# Patient Record
Sex: FEMALE | Race: BLACK OR AFRICAN AMERICAN | Employment: PART TIME | URBAN - METROPOLITAN AREA
[De-identification: names, ages, dates, MRNs, and addresses within clinical notes are randomized per-mention and may not be internally consistent; named-entity substitution may affect disease eponyms.]

---

## 2018-10-08 ENCOUNTER — HOSPITAL ENCOUNTER (EMERGENCY)
Age: 28
Discharge: HOME | End: 2018-10-08

## 2018-10-08 ENCOUNTER — APPOINTMENT (OUTPATIENT)
Dept: CT IMAGING | Age: 28
End: 2018-10-08

## 2018-10-08 VITALS
OXYGEN SATURATION: 100 % | HEART RATE: 48 BPM | RESPIRATION RATE: 16 BRPM | WEIGHT: 128.13 LBS | DIASTOLIC BLOOD PRESSURE: 70 MMHG | BODY MASS INDEX: 21.32 KG/M2 | TEMPERATURE: 98.2 F | SYSTOLIC BLOOD PRESSURE: 114 MMHG

## 2018-10-08 DIAGNOSIS — R19.7 NAUSEA VOMITING AND DIARRHEA: Primary | ICD-10-CM

## 2018-10-08 DIAGNOSIS — R11.2 NAUSEA VOMITING AND DIARRHEA: Primary | ICD-10-CM

## 2018-10-08 LAB
A/G RATIO: 1.2 (ref 1.1–2.2)
ALBUMIN SERPL-MCNC: 4.4 G/DL (ref 3.4–5)
ALP BLD-CCNC: 74 U/L (ref 40–129)
ALT SERPL-CCNC: 10 U/L (ref 10–40)
ANION GAP SERPL CALCULATED.3IONS-SCNC: 9 MMOL/L (ref 3–16)
AST SERPL-CCNC: 17 U/L (ref 15–37)
BACTERIA: ABNORMAL /HPF
BASOPHILS ABSOLUTE: 0 K/UL (ref 0–0.2)
BASOPHILS RELATIVE PERCENT: 0.2 %
BILIRUB SERPL-MCNC: 0.3 MG/DL (ref 0–1)
BILIRUBIN URINE: NEGATIVE
BLOOD, URINE: NEGATIVE
BUN BLDV-MCNC: 13 MG/DL (ref 7–20)
CALCIUM SERPL-MCNC: 9.9 MG/DL (ref 8.3–10.6)
CHLORIDE BLD-SCNC: 103 MMOL/L (ref 99–110)
CLARITY: ABNORMAL
CO2: 26 MMOL/L (ref 21–32)
COLOR: YELLOW
CREAT SERPL-MCNC: 0.6 MG/DL (ref 0.6–1.1)
EOSINOPHILS ABSOLUTE: 0 K/UL (ref 0–0.6)
EOSINOPHILS RELATIVE PERCENT: 0.4 %
EPITHELIAL CELLS, UA: 5 /HPF (ref 0–5)
GFR AFRICAN AMERICAN: >60
GFR NON-AFRICAN AMERICAN: >60
GLOBULIN: 3.6 G/DL
GLUCOSE BLD-MCNC: 112 MG/DL (ref 70–99)
GLUCOSE URINE: NEGATIVE MG/DL
GONADOTROPIN, CHORIONIC (HCG) QUANT: <5 MIU/ML
HCT VFR BLD CALC: 45.2 % (ref 36–48)
HEMOGLOBIN: 14.7 G/DL (ref 12–16)
HYALINE CASTS: 9 /LPF (ref 0–8)
KETONES, URINE: NEGATIVE MG/DL
LEUKOCYTE ESTERASE, URINE: ABNORMAL
LIPASE: 49 U/L (ref 13–60)
LYMPHOCYTES ABSOLUTE: 1.2 K/UL (ref 1–5.1)
LYMPHOCYTES RELATIVE PERCENT: 15.9 %
MCH RBC QN AUTO: 27.3 PG (ref 26–34)
MCHC RBC AUTO-ENTMCNC: 32.4 G/DL (ref 31–36)
MCV RBC AUTO: 84.3 FL (ref 80–100)
MICROSCOPIC EXAMINATION: YES
MONOCYTES ABSOLUTE: 0.7 K/UL (ref 0–1.3)
MONOCYTES RELATIVE PERCENT: 8.6 %
NEUTROPHILS ABSOLUTE: 5.7 K/UL (ref 1.7–7.7)
NEUTROPHILS RELATIVE PERCENT: 74.9 %
NITRITE, URINE: NEGATIVE
PDW BLD-RTO: 13.6 % (ref 12.4–15.4)
PH UA: 6
PLATELET # BLD: 228 K/UL (ref 135–450)
PMV BLD AUTO: 8.5 FL (ref 5–10.5)
POTASSIUM SERPL-SCNC: 4.2 MMOL/L (ref 3.5–5.1)
PROTEIN UA: NEGATIVE MG/DL
RBC # BLD: 5.37 M/UL (ref 4–5.2)
RBC UA: 3 /HPF (ref 0–4)
SODIUM BLD-SCNC: 138 MMOL/L (ref 136–145)
SPECIFIC GRAVITY UA: 1.02
TOTAL PROTEIN: 8 G/DL (ref 6.4–8.2)
URINE REFLEX TO CULTURE: YES
URINE TYPE: ABNORMAL
UROBILINOGEN, URINE: 0.2 E.U./DL
WBC # BLD: 7.7 K/UL (ref 4–11)
WBC UA: 4 /HPF (ref 0–5)

## 2018-10-08 PROCEDURE — 83690 ASSAY OF LIPASE: CPT

## 2018-10-08 PROCEDURE — 6360000004 HC RX CONTRAST MEDICATION: Performed by: NURSE PRACTITIONER

## 2018-10-08 PROCEDURE — 80053 COMPREHEN METABOLIC PANEL: CPT

## 2018-10-08 PROCEDURE — 87086 URINE CULTURE/COLONY COUNT: CPT

## 2018-10-08 PROCEDURE — 85025 COMPLETE CBC W/AUTO DIFF WBC: CPT

## 2018-10-08 PROCEDURE — 84702 CHORIONIC GONADOTROPIN TEST: CPT

## 2018-10-08 PROCEDURE — 36415 COLL VENOUS BLD VENIPUNCTURE: CPT

## 2018-10-08 PROCEDURE — 81001 URINALYSIS AUTO W/SCOPE: CPT

## 2018-10-08 PROCEDURE — 99284 EMERGENCY DEPT VISIT MOD MDM: CPT

## 2018-10-08 PROCEDURE — 96374 THER/PROPH/DIAG INJ IV PUSH: CPT

## 2018-10-08 PROCEDURE — 6360000002 HC RX W HCPCS: Performed by: PHYSICIAN ASSISTANT

## 2018-10-08 PROCEDURE — 74177 CT ABD & PELVIS W/CONTRAST: CPT

## 2018-10-08 PROCEDURE — 2580000003 HC RX 258: Performed by: PHYSICIAN ASSISTANT

## 2018-10-08 PROCEDURE — 96361 HYDRATE IV INFUSION ADD-ON: CPT

## 2018-10-08 PROCEDURE — 96375 TX/PRO/DX INJ NEW DRUG ADDON: CPT

## 2018-10-08 RX ORDER — 0.9 % SODIUM CHLORIDE 0.9 %
1000 INTRAVENOUS SOLUTION INTRAVENOUS ONCE
Status: COMPLETED | OUTPATIENT
Start: 2018-10-08 | End: 2018-10-08

## 2018-10-08 RX ORDER — MORPHINE SULFATE 4 MG/ML
4 INJECTION, SOLUTION INTRAMUSCULAR; INTRAVENOUS ONCE
Status: COMPLETED | OUTPATIENT
Start: 2018-10-08 | End: 2018-10-08

## 2018-10-08 RX ORDER — ONDANSETRON 4 MG/1
4-8 TABLET, ORALLY DISINTEGRATING ORAL EVERY 12 HOURS PRN
Qty: 12 TABLET | Refills: 0 | Status: ON HOLD | OUTPATIENT
Start: 2018-10-08 | End: 2019-05-10 | Stop reason: HOSPADM

## 2018-10-08 RX ORDER — DICYCLOMINE HYDROCHLORIDE 10 MG/1
10 CAPSULE ORAL EVERY 6 HOURS PRN
Qty: 20 CAPSULE | Refills: 0 | Status: ON HOLD | OUTPATIENT
Start: 2018-10-08 | End: 2019-05-10 | Stop reason: HOSPADM

## 2018-10-08 RX ORDER — ONDANSETRON 2 MG/ML
4 INJECTION INTRAMUSCULAR; INTRAVENOUS ONCE
Status: COMPLETED | OUTPATIENT
Start: 2018-10-08 | End: 2018-10-08

## 2018-10-08 RX ADMIN — SODIUM CHLORIDE 1000 ML: 9 INJECTION, SOLUTION INTRAVENOUS at 12:56

## 2018-10-08 RX ADMIN — ONDANSETRON 4 MG: 2 INJECTION INTRAMUSCULAR; INTRAVENOUS at 12:56

## 2018-10-08 RX ADMIN — MORPHINE SULFATE 4 MG: 4 INJECTION INTRAVENOUS at 12:56

## 2018-10-08 RX ADMIN — IOPAMIDOL 75 ML: 755 INJECTION, SOLUTION INTRAVENOUS at 13:58

## 2018-10-08 ASSESSMENT — ENCOUNTER SYMPTOMS
RHINORRHEA: 0
DIARRHEA: 0
CONSTIPATION: 0
ABDOMINAL PAIN: 1
NAUSEA: 1
SHORTNESS OF BREATH: 0
BLOOD IN STOOL: 0
VOMITING: 0
SORE THROAT: 0

## 2018-10-08 ASSESSMENT — PAIN DESCRIPTION - LOCATION: LOCATION: ABDOMEN

## 2018-10-08 ASSESSMENT — PAIN SCALES - GENERAL
PAINLEVEL_OUTOF10: 7
PAINLEVEL_OUTOF10: 9

## 2018-10-08 ASSESSMENT — PAIN DESCRIPTION - PROGRESSION: CLINICAL_PROGRESSION: GRADUALLY IMPROVING

## 2018-10-08 ASSESSMENT — PAIN DESCRIPTION - PAIN TYPE: TYPE: ACUTE PAIN

## 2018-10-08 NOTE — ED NOTES
Pt d/c instructions given. Pt verbalizes understanding. Pt getting dressed.       Merna Bucoi, RN  10/08/18 7805

## 2018-10-08 NOTE — ED PROVIDER NOTES
Cardiovascular: Normal rate, regular rhythm, normal heart sounds and intact distal pulses. Exam reveals no gallop and no friction rub. No murmur heard. Pulmonary/Chest: Effort normal and breath sounds normal. No stridor. No respiratory distress. She has no wheezes. She has no rales. She exhibits no tenderness. Abdominal: Soft. Bowel sounds are normal. She exhibits no distension and no mass. There is no tenderness. There is no rebound and no guarding. Musculoskeletal: Normal range of motion. She exhibits no edema or tenderness. Neurological: She is alert and oriented to person, place, and time. Coordination normal.   Skin: Skin is warm and dry. She is not diaphoretic. No pallor. Psychiatric: She has a normal mood and affect. Her behavior is normal.   Nursing note and vitals reviewed.       DIAGNOSTIC RESULTS   LABS:    Labs Reviewed   CBC WITH AUTO DIFFERENTIAL - Abnormal; Notable for the following:        Result Value    RBC 5.37 (*)     All other components within normal limits    Narrative:     Performed at:  [facility] [address], [place], [address] [address]   Phone [phone]   COMPREHENSIVE METABOLIC PANEL - Abnormal; Notable for the following:     Glucose 112 (*)     All other components within normal limits    Narrative:     Performed at:  [facility] [address], [place], [facility] [address]   Phone [phone]   URINE RT REFLEX TO CULTURE - Abnormal; Notable for the following:     Clarity, UA CLOUDY (*)     Leukocyte Esterase, Urine TRACE (*)     All other components within normal limits    Narrative:     Performed at:  [facility] [address], [place], [facility] [address]   Phone [phone]   MICROSCOPIC URINALYSIS - Abnormal; Notable for the following:     Bacteria, UA RARE (*)     Hyaline Casts, UA 9 (*)     All other components within normal limits    Narrative:     Performed at:  [facility]

## 2018-10-09 LAB — URINE CULTURE, ROUTINE: NORMAL

## 2019-02-10 ENCOUNTER — HOSPITAL ENCOUNTER (EMERGENCY)
Age: 29
Discharge: HOME | End: 2019-02-10
Payer: MEDICAID

## 2019-02-10 VITALS
SYSTOLIC BLOOD PRESSURE: 120 MMHG | BODY MASS INDEX: 20.83 KG/M2 | DIASTOLIC BLOOD PRESSURE: 65 MMHG | RESPIRATION RATE: 18 BRPM | HEART RATE: 70 BPM | OXYGEN SATURATION: 100 % | WEIGHT: 125 LBS | TEMPERATURE: 98 F | HEIGHT: 65 IN

## 2019-02-10 DIAGNOSIS — K08.89 PAIN, DENTAL: ICD-10-CM

## 2019-02-10 DIAGNOSIS — R22.0 MOUTH SWELLING: Primary | ICD-10-CM

## 2019-02-10 PROCEDURE — 99282 EMERGENCY DEPT VISIT SF MDM: CPT

## 2019-02-10 PROCEDURE — 6370000000 HC RX 637 (ALT 250 FOR IP): Performed by: NURSE PRACTITIONER

## 2019-02-10 RX ORDER — HYDROCODONE BITARTRATE AND ACETAMINOPHEN 5; 325 MG/1; MG/1
1 TABLET ORAL ONCE
Status: COMPLETED | OUTPATIENT
Start: 2019-02-10 | End: 2019-02-10

## 2019-02-10 RX ORDER — IBUPROFEN 800 MG/1
800 TABLET ORAL EVERY 6 HOURS PRN
COMMUNITY
End: 2019-02-10

## 2019-02-10 RX ORDER — HYDROCODONE BITARTRATE AND ACETAMINOPHEN 5; 325 MG/1; MG/1
1 TABLET ORAL EVERY 6 HOURS PRN
Qty: 10 TABLET | Refills: 0 | Status: SHIPPED | OUTPATIENT
Start: 2019-02-10 | End: 2019-02-13

## 2019-02-10 RX ORDER — IBUPROFEN 800 MG/1
800 TABLET ORAL EVERY 8 HOURS PRN
Qty: 20 TABLET | Refills: 0 | Status: SHIPPED | OUTPATIENT
Start: 2019-02-10

## 2019-02-10 RX ORDER — HYDROCODONE BITARTRATE AND ACETAMINOPHEN 7.5; 325 MG/1; MG/1
1 TABLET ORAL EVERY 6 HOURS PRN
COMMUNITY
End: 2019-02-10

## 2019-02-10 RX ORDER — IBUPROFEN 800 MG/1
800 TABLET ORAL ONCE
Status: COMPLETED | OUTPATIENT
Start: 2019-02-10 | End: 2019-02-10

## 2019-02-10 RX ADMIN — IBUPROFEN 800 MG: 800 TABLET, FILM COATED ORAL at 22:23

## 2019-02-10 RX ADMIN — HYDROCODONE BITARTRATE AND ACETAMINOPHEN 1 TABLET: 5; 325 TABLET ORAL at 22:24

## 2019-02-10 ASSESSMENT — ENCOUNTER SYMPTOMS
ABDOMINAL PAIN: 0
CHEST TIGHTNESS: 0
NAUSEA: 0
DIARRHEA: 0
SHORTNESS OF BREATH: 0
VOMITING: 0

## 2019-02-10 ASSESSMENT — PAIN SCALES - GENERAL: PAINLEVEL_OUTOF10: 10

## 2019-02-10 ASSESSMENT — PAIN DESCRIPTION - PAIN TYPE: TYPE: ACUTE PAIN

## 2019-02-10 ASSESSMENT — PAIN DESCRIPTION - LOCATION: LOCATION: MOUTH

## 2019-05-07 ENCOUNTER — HOSPITAL ENCOUNTER (INPATIENT)
Age: 29
LOS: 3 days | Discharge: HOME | DRG: 885 | End: 2019-05-10
Attending: PSYCHIATRY & NEUROLOGY | Admitting: PSYCHIATRY & NEUROLOGY
Payer: MEDICAID

## 2019-05-07 ENCOUNTER — HOSPITAL ENCOUNTER (EMERGENCY)
Age: 29
Discharge: PSYCHIATRIC OR SUBSTANCE USE FACILITY | End: 2019-05-07
Attending: EMERGENCY MEDICINE
Payer: MEDICAID

## 2019-05-07 VITALS
BODY MASS INDEX: 20.47 KG/M2 | OXYGEN SATURATION: 98 % | TEMPERATURE: 98 F | HEART RATE: 78 BPM | DIASTOLIC BLOOD PRESSURE: 89 MMHG | SYSTOLIC BLOOD PRESSURE: 135 MMHG | WEIGHT: 123 LBS | RESPIRATION RATE: 16 BRPM

## 2019-05-07 DIAGNOSIS — F19.10 SUBSTANCE ABUSE (HCC): ICD-10-CM

## 2019-05-07 DIAGNOSIS — R45.851 DEPRESSION WITH SUICIDAL IDEATION: Primary | ICD-10-CM

## 2019-05-07 DIAGNOSIS — R45.851 PLANNING TO COMMIT SUICIDE: ICD-10-CM

## 2019-05-07 DIAGNOSIS — F32.A DEPRESSION WITH SUICIDAL IDEATION: Primary | ICD-10-CM

## 2019-05-07 LAB
A/G RATIO: 1.5 (ref 1.1–2.2)
ACETAMINOPHEN LEVEL: <5 UG/ML (ref 10–30)
ALBUMIN SERPL-MCNC: 4.1 G/DL (ref 3.4–5)
ALP BLD-CCNC: 59 U/L (ref 40–129)
ALT SERPL-CCNC: 10 U/L (ref 10–40)
AMPHETAMINE SCREEN, URINE: POSITIVE
ANION GAP SERPL CALCULATED.3IONS-SCNC: 13 MMOL/L (ref 3–16)
AST SERPL-CCNC: 18 U/L (ref 15–37)
BACTERIA WET PREP: NORMAL
BARBITURATE SCREEN URINE: ABNORMAL
BASOPHILS ABSOLUTE: 0.1 K/UL (ref 0–0.2)
BASOPHILS RELATIVE PERCENT: 0.9 %
BENZODIAZEPINE SCREEN, URINE: POSITIVE
BILIRUB SERPL-MCNC: 0.5 MG/DL (ref 0–1)
BILIRUBIN URINE: NEGATIVE
BLOOD, URINE: NEGATIVE
BUN BLDV-MCNC: 14 MG/DL (ref 7–20)
CALCIUM SERPL-MCNC: 9.4 MG/DL (ref 8.3–10.6)
CANNABINOID SCREEN URINE: ABNORMAL
CHLORIDE BLD-SCNC: 99 MMOL/L (ref 99–110)
CLARITY: CLEAR
CLUE CELLS: NORMAL
CO2: 25 MMOL/L (ref 21–32)
COCAINE METABOLITE SCREEN URINE: POSITIVE
COLOR: YELLOW
CREAT SERPL-MCNC: <0.5 MG/DL (ref 0.6–1.1)
EKG ATRIAL RATE: 81 BPM
EKG DIAGNOSIS: NORMAL
EKG P AXIS: 66 DEGREES
EKG P-R INTERVAL: 168 MS
EKG Q-T INTERVAL: 376 MS
EKG QRS DURATION: 88 MS
EKG QTC CALCULATION (BAZETT): 436 MS
EKG R AXIS: 74 DEGREES
EKG T AXIS: 61 DEGREES
EKG VENTRICULAR RATE: 81 BPM
EOSINOPHILS ABSOLUTE: 0.2 K/UL (ref 0–0.6)
EOSINOPHILS RELATIVE PERCENT: 3.7 %
EPITHELIAL CELLS WET PREP: NORMAL
ETHANOL: NORMAL MG/DL (ref 0–0.08)
GFR AFRICAN AMERICAN: >60
GFR NON-AFRICAN AMERICAN: >60
GLOBULIN: 2.8 G/DL
GLUCOSE BLD-MCNC: 102 MG/DL (ref 70–99)
GLUCOSE URINE: NEGATIVE MG/DL
HCG QUALITATIVE: NEGATIVE
HCT VFR BLD CALC: 34.7 % (ref 36–48)
HEMOGLOBIN: 11.6 G/DL (ref 12–16)
KETONES, URINE: NEGATIVE MG/DL
LEUKOCYTE ESTERASE, URINE: NEGATIVE
LYMPHOCYTES ABSOLUTE: 1.7 K/UL (ref 1–5.1)
LYMPHOCYTES RELATIVE PERCENT: 26.6 %
Lab: ABNORMAL
MCH RBC QN AUTO: 28.3 PG (ref 26–34)
MCHC RBC AUTO-ENTMCNC: 33.5 G/DL (ref 31–36)
MCV RBC AUTO: 84.4 FL (ref 80–100)
METHADONE SCREEN, URINE: ABNORMAL
MICROSCOPIC EXAMINATION: NORMAL
MONOCYTES ABSOLUTE: 0.8 K/UL (ref 0–1.3)
MONOCYTES RELATIVE PERCENT: 12.8 %
NEUTROPHILS ABSOLUTE: 3.6 K/UL (ref 1.7–7.7)
NEUTROPHILS RELATIVE PERCENT: 56 %
NITRITE, URINE: NEGATIVE
OPIATE SCREEN URINE: POSITIVE
OXYCODONE URINE: ABNORMAL
PDW BLD-RTO: 13.6 % (ref 12.4–15.4)
PH UA: 6
PH UA: 6 (ref 5–8)
PHENCYCLIDINE SCREEN URINE: ABNORMAL
PLATELET # BLD: 280 K/UL (ref 135–450)
PMV BLD AUTO: 7.9 FL (ref 5–10.5)
POTASSIUM SERPL-SCNC: 3.7 MMOL/L (ref 3.5–5.1)
PROPOXYPHENE SCREEN: ABNORMAL
PROTEIN UA: NEGATIVE MG/DL
RBC # BLD: 4.11 M/UL (ref 4–5.2)
RBC WET PREP: NORMAL
SALICYLATE, SERUM: <0.3 MG/DL (ref 15–30)
SODIUM BLD-SCNC: 137 MMOL/L (ref 136–145)
SOURCE WET PREP: NORMAL
SPECIFIC GRAVITY UA: 1.02 (ref 1–1.03)
TOTAL PROTEIN: 6.9 G/DL (ref 6.4–8.2)
TRICHOMONAS PREP: NORMAL
URINE REFLEX TO CULTURE: NORMAL
URINE TYPE: NORMAL
UROBILINOGEN, URINE: 0.2 E.U./DL
WBC # BLD: 6.5 K/UL (ref 4–11)
WBC WET PREP: NORMAL
YEAST WET PREP: NORMAL

## 2019-05-07 PROCEDURE — 81003 URINALYSIS AUTO W/O SCOPE: CPT

## 2019-05-07 PROCEDURE — 87591 N.GONORRHOEAE DNA AMP PROB: CPT

## 2019-05-07 PROCEDURE — 84703 CHORIONIC GONADOTROPIN ASSAY: CPT

## 2019-05-07 PROCEDURE — 87491 CHLMYD TRACH DNA AMP PROBE: CPT

## 2019-05-07 PROCEDURE — 80053 COMPREHEN METABOLIC PANEL: CPT

## 2019-05-07 PROCEDURE — G0480 DRUG TEST DEF 1-7 CLASSES: HCPCS

## 2019-05-07 PROCEDURE — 93010 ELECTROCARDIOGRAM REPORT: CPT | Performed by: INTERNAL MEDICINE

## 2019-05-07 PROCEDURE — 93005 ELECTROCARDIOGRAM TRACING: CPT | Performed by: PHYSICIAN ASSISTANT

## 2019-05-07 PROCEDURE — 87210 SMEAR WET MOUNT SALINE/INK: CPT

## 2019-05-07 PROCEDURE — 6370000000 HC RX 637 (ALT 250 FOR IP): Performed by: PHYSICIAN ASSISTANT

## 2019-05-07 PROCEDURE — 1240000000 HC EMOTIONAL WELLNESS R&B

## 2019-05-07 PROCEDURE — 99285 EMERGENCY DEPT VISIT HI MDM: CPT

## 2019-05-07 PROCEDURE — 85025 COMPLETE CBC W/AUTO DIFF WBC: CPT

## 2019-05-07 PROCEDURE — 80307 DRUG TEST PRSMV CHEM ANLYZR: CPT

## 2019-05-07 RX ORDER — NICOTINE 21 MG/24HR
1 PATCH, TRANSDERMAL 24 HOURS TRANSDERMAL DAILY
Status: DISCONTINUED | OUTPATIENT
Start: 2019-05-07 | End: 2019-05-07 | Stop reason: HOSPADM

## 2019-05-07 ASSESSMENT — ENCOUNTER SYMPTOMS
NAUSEA: 0
CONSTIPATION: 0
COLOR CHANGE: 0
ABDOMINAL PAIN: 0
DIARRHEA: 0
COUGH: 0
ALLERGIC/IMMUNOLOGIC NEGATIVE: 1
WHEEZING: 0
SHORTNESS OF BREATH: 0
VOMITING: 0
STRIDOR: 0
BACK PAIN: 0
ABDOMINAL DISTENTION: 0

## 2019-05-07 NOTE — ED NOTES
Patient awake in bed, eating without difficulty. ED tech Shirley at the bedside for patient safety. Patient ambulated to bathroom for self swab for patient requested STD check.      1306 Marleen RAY RN  05/07/19 6691

## 2019-05-07 NOTE — ED PROVIDER NOTES
[facility]        Pt Name: Jerald Sauceda  MRN: [id number]  [place] 1990  Date of evaluation: 5/7/2019  Provider: Loyd Stratton PA-C  PCP: Unknown Provider Result (Inactive)    This patient was seen and evaluated by the attending physician Gómez Lewis III, DO.      CHIEF COMPLAINT       Chief Complaint   Patient presents with    Suicidal     Pt to ER with thoughts of wanting to kill herself, states present for 3 days. states \"I would take a lot of  pills\", denies attempt, but states \"debbie been thinking about it a lot\". HISTORY OF PRESENT ILLNESS   (Location/Symptom, Timing/Onset, Context/Setting, Quality, Duration, Modifying Factors, Severity)  Note limiting factors. Jerald Sauceda is a 34 y.o. female with history of asthma, osteoporosis, suicidal ideation/prior attempt, anxiety, depression, substance abuse who presents to the emergency department with complaints that she has been feeling very depressed for the past 3 days. She was recently evicted from her household and is currently staying with a friend. She has also been abusing cocaine and fentanyl by snorting these drugs. She does intermittently go on binge drinking. She drank 6 shots of liquor on Sunday. She has suicidal thoughts with plan of overdosing on pills. She has not attempted yet. She presents with intermittent pain all over her body. She is not seeking counseling at this time, claiming that her ex- had an fair with her counselor. She has been admitted to [facility] in the past for suicidal ideation. Although she is asymptomatic, she wants to be checked for STD also. Nursing Notes were all reviewed and agreed with or any disagreements were addressed  in the HPI. REVIEW OF SYSTEMS    (2-9 systems for level 4, 10 or more for level 5)     Review of Systems   Constitutional: Negative for chills and fever. HENT: Negative. Eyes: Negative for visual disturbance. Respiratory: Negative for cough, shortness of breath, wheezing and stridor. Cardiovascular: Negative for chest pain, palpitations and leg swelling. Gastrointestinal: Negative for abdominal distention, abdominal pain, constipation, diarrhea, nausea and vomiting. Endocrine: Negative. Genitourinary: Negative. Musculoskeletal: Positive for myalgias. Negative for back pain, neck pain and neck stiffness. Skin: Negative for color change, pallor, rash and wound. Allergic/Immunologic: Negative. Neurological: Negative for dizziness, tremors, seizures, syncope, facial asymmetry, speech difficulty, weakness, light-headedness, numbness and headaches. Hematological: Negative. Psychiatric/Behavioral: Positive for sleep disturbance (insomnia) and suicidal ideas. Negative for confusion, hallucinations and self-injury. The patient is nervous/anxious. All other systems reviewed and are negative. Positives and Pertinent negatives as per HPI. Except as noted abovein the ROS, all other systems were reviewed and negative. PAST MEDICAL HISTORY     Past Medical History:   Diagnosis Date    Asthma     Osteoporosis     Premenopausal patient     Suicide attempt [facility])          SURGICAL HISTORY   History reviewed. No pertinent surgical history.       CURRENTMEDICATIONS       Previous Medications    ALBUTEROL SULFATE HFA (PROVENTIL HFA) 108 (90 BASE) MCG/ACT INHALER    Inhale 2 puffs into the lungs every 6 hours as needed for Wheezing    CITALOPRAM (CELEXA) 10 MG TABLET    Take 2 tablets by mouth daily    DICYCLOMINE (BENTYL) 10 MG CAPSULE    Take 1 capsule by mouth every 6 hours as needed (cramps)    IBUPROFEN (ADVIL;MOTRIN) 800 MG TABLET    Take 1 tablet by mouth every 8 hours as needed for Pain or Fever    ONDANSETRON (ZOFRAN ODT) 4 MG DISINTEGRATING TABLET    Take 1-2 tablets by mouth every 12 hours as needed for Nausea May Sub regular tablet (non-ODT) if insurance does not cover ODT. ALLERGIES     Patient has no known allergies. FAMILYHISTORY       Family History   Problem Relation Age of Onset    Diabetes Mother     Cancer Maternal Grandmother     Diabetes Maternal Grandfather           SOCIAL HISTORY       Social History     Socioeconomic History    Marital status:      Spouse name: Lai Segura Number of children: None    Years of education: 15    Highest education level: None   Occupational History    Occupation: Unemployed   Social Needs    Financial resource strain: None    Food insecurity:     Worry: None     Inability: None    Transportation needs:     Medical: None     Non-medical: None   Tobacco Use    Smoking status: Former Smoker     Packs/day: 0.50     Years: 10.00     Pack years: 5.00     Types: Cigarettes    Smokeless tobacco: Never Used   Substance and Sexual Activity    Alcohol use: Yes     Comment: OCC    Drug use: Yes     Types: Marijuana, IV     Comment: HX OF IV HEROIN.  Sexual activity: Yes     Partners: Male   Lifestyle    Physical activity:     Days per week: None     Minutes per session: None    Stress: None   Relationships    Social connections:     Talks on phone: None     Gets together: None     Attends Amish service: None     Active member of club or organization: None     Attends meetings of clubs or organizations: None     Relationship status: None    Intimate partner violence:     Fear of current or ex partner: None     Emotionally abused: None     Physically abused: None     Forced sexual activity: None   Other Topics Concern    None   Social History Narrative    None       SCREENINGS             PHYSICAL EXAM    (up to 7 for level 4, 8 or more for level 5)     ED Triage Vitals [05/07/19 1050]   BP Temp Temp Source Pulse Resp SpO2 Height Weight   123/71 98.2 °F (36.8 °C) Oral 81 -- 97 % -- 123 lb (55.8 kg)       Physical Exam   Constitutional: She is oriented to person, place, and time.  She appears well-developed and well-nourished. No distress. HENT:   Head: Normocephalic and atraumatic. Right Ear: External ear normal.   Left Ear: External ear normal.   Nose: Nose normal.   Mouth/Throat: Oropharynx is clear and moist.   Eyes: Pupils are equal, round, and reactive to light. Conjunctivae and EOM are normal. Right eye exhibits no discharge. Left eye exhibits no discharge. No scleral icterus. Neck: Normal range of motion. No JVD present. No Brudzinski's sign and no Kernig's sign noted. Cardiovascular: Normal rate. Pulmonary/Chest: Effort normal and breath sounds normal.   Abdominal: Soft. Bowel sounds are normal. She exhibits no distension. There is no tenderness. Musculoskeletal: Normal range of motion. Lymphadenopathy:     She has no cervical adenopathy. Neurological: She is alert and oriented to person, place, and time. She displays normal reflexes. No cranial nerve deficit (II-XII intact) or sensory deficit. Skin: Skin is warm and dry. Capillary refill takes less than 2 seconds. No rash noted. She is not diaphoretic. No erythema. No pallor. Psychiatric: Her speech is normal and behavior is normal. Judgment normal. Her mood appears anxious. She is not actively hallucinating. Thought content is not paranoid and not delusional. Cognition and memory are normal. She exhibits a depressed mood. She expresses suicidal ideation. She expresses no homicidal ideation. She expresses suicidal plans. She expresses no homicidal plans. She is attentive. Nursing note and vitals reviewed.       DIAGNOSTIC RESULTS   LABS:    Labs Reviewed   CBC WITH AUTO DIFFERENTIAL - Abnormal; Notable for the following components:       Result Value    Hemoglobin 11.6 (*)     Hematocrit 34.7 (*)     All other components within normal limits    Narrative:     Performed at:  [facility] [address], [facility], [address]   Phone [phone]   COMPREHENSIVE METABOLIC PANEL - Abnormal; Notable for the following components:    Glucose 102 (*)     CREATININE <0.5 (*)     All other components within normal limits    Narrative:     Performed at:  [facility] [address],  [facility], [address]   Phone [phone]   [address] - Abnormal; Notable for the following components:    Amphetamine Screen, Urine POSITIVE (*)     Benzodiazepine Screen, Urine POSITIVE (*)     Cocaine Metabolite Screen, Urine POSITIVE (*)     Opiate Scrn, Ur POSITIVE (*)     All other components within normal limits    Narrative:     Performed at:  [facility] [address],  [facility], [address]   Phone [phone]   ACETAMINOPHEN LEVEL - Abnormal; Notable for the following components:    Acetaminophen Level <5 (*)     All other components within normal limits    Narrative:     Performed at:  [facility] [address],  [facility], [address]   Phone [phone]   SALICYLATE LEVEL - Abnormal; Notable for the following components:    Salicylate, Serum <3.5 (*)     All other components within normal limits    Narrative:     Performed at:  [facility] [address],  [facility], [facility] [address]   Phone [phone]   WET PREP, GENITAL    Narrative:     Performed at:  [facility] [address],  [facility], [address]   Phone [phone]   URINE RT REFLEX TO CULTURE    Narrative:     Performed at:  [facility] [address],  [facility], [address]   Phone [phone]   ETHANOL    Narrative:     Performed at:  [facility] [address],  [facility], [facility] [address]   Phone [phone]   HCG, SERUM, QUALITATIVE    Narrative:     Performed at:  [facility] [address],  [facility], [address]   Phone [phone] All other labs were within normal range or not returned as of this dictation. EKG: All EKG's are interpreted by the Emergency Department Physician who either signs orCo-signs this chart in the absence of a cardiologist.  Please see their note for interpretation of EKG. RADIOLOGY:   Non-plain film images such as CT, Ultrasound and MRI are read by the radiologist. Plain radiographic images are visualized andpreliminarily interpreted by the  ED Provider with the below findings:        Interpretation perthe Radiologist below, if available at the time of this note:    No orders to display           PROCEDURES   Unless otherwise noted below, none     Procedures    [address]777  There was a high probability of life-threatening clinical deterioration in the patient's condition requiring my urgent intervention. Total critical care time with the patient was 31 minutes excluding separately reportable procedures. Critical care required due to patients suicidal ideation with plan prompting medical clearance, precautions, consultation and transfer to psych facility. CONSULTS:  We are still awaiting a bed / acceptance at a psych facility. EMERGENCY DEPARTMENT COURSE and DIFFERENTIALDIAGNOSIS/MDM:   Vitals:    Vitals:    05/07/19 1050 05/07/19 1105 05/07/19 1234   BP: 123/71  107/60   Pulse: 81  83   Resp:  18    Temp: 98.2 °F (36.8 °C)     TempSrc: Oral     SpO2: 97%     Weight: 123 lb (55.8 kg)         Patient was given thefollowing medications:  Medications   nicotine (NICODERM CQ) 21 MG/24HR 1 patch (1 patch Transdermal Patch Applied 5/7/19 1242)       This patient presents to the emergency department with suicidal ideation with plan of suicide. Denies attempt. She admits to substance abuse. Urinalysis shows evidence of amphetamine use, benzodiazepine use, opioid use, cocaine use. Other blood work is relatively stable. Urinalysis does not suggest infection.   Wet prep unremarkable. GC/Chlamydia cultures are pending. She smokes one pack of cigarettes daily and did request nicotine patch. We did oblige. Patient understands and agrees with plan for transfer to appropriate psychiatric facility for further management. We do feel that she can be medically cleared. My suspicion is low for ACS, PE, myocarditis, pericarditis, endocarditis, acute pulmonary edema, pleural effusion, pericardial effusion, cardiac tamponade, cardiomyopathy, CHF exacerbation, thoracic aortic dissection, esophageal rupture, other life-threatening arrhythmia, hypertensive urgency or emergency, hemothorax, pulmonary contusion, subcutaneous emphysema, flail chest, pneumo mediastinum, rib fractur, pneumonia, sepsis, dka, hypoglycemia, overdose, acute surgical abdomen, obstruction, perforation, abscess, mesenteric ischemia, AAA, dissection, cholecystitis, cholangitis, pancreatitis, appendicitis, C. diff colitis, diverticulitis, volvulus, incarcerated hernia, necrotizing fasciitis, TOA, ovarian torsion, PID, ectopic pregnancy, eliot camryn Brayden syndrome,  pyelonephritis, perinephric abscess, kidney stone, urosepsis, fistula  carotid dissection, sinus abscess, acute fracture, acute CVA, ICH, SAH, TIA, meningitis, encephalitis, pseudotumor cerebri, temporal arteritis, sentinel bleed from ruptured aneurysm, hypertensive urgency or emergency, subdural hematoma, epidural hematoma  or other concerning pathology. We have addressed concerns and expectations. FINAL IMPRESSION      1. Depression with suicidal ideation    2. Planning to commit suicide    3. Substance abuse [facility])          DISPOSITION/PLAN   DISPOSITION Decision To Transfer 05/07/2019 11:34:52 AM      PATIENT REFERREDTO:  No follow-up provider specified.     DISCHARGE MEDICATIONS:  New Prescriptions    No medications on file       DISCONTINUED MEDICATIONS:  Discontinued Medications    No medications on file              (Please note that portions ofthis note were completed with a voice recognition program.  Efforts were made to edit the dictations but occasionally words are mis-transcribed.)    Silvia Yoder PA-C (electronically signed)           Silvia Yoder PA-C  05/07/19 1514

## 2019-05-07 NOTE — ED PROVIDER NOTES
I independently performed a history and physical on Florina Fabry. All diagnostic, treatment, and disposition decisions were made by myself in conjunction with the advanced practice provider. Briefly, this is a 34 y.o. female here for suicide attempt. Patient reports severe depression. She does use cocaine and fentanyl. She also drinks alcohol intermittently. She tells me that she has suicidal thoughts and a plan to overdose on narcotics of some type. .  See ARIANNA note      On exam:  Constitutional:  Well developed, well nourished, non-toxic appearance   Eyes:   conjunctiva normal   HENT:  Atraumatic,  Neck- normal range of motion, supple   Respiratory:  No respiratory distress, normal breath sounds, no rales, no wheezing   Cardiovascular:  Normal rate, normal rhythm, no murmurs,   GI:  Soft, nondistended, nontender, no obvious organomegaly, no mass, no rebound, no guarding   Musculoskeletal:  No tenderness, no deformities. Integument:  no rashes on exposed surfaces  Neurologic:  Alert & oriented x 3,   no focal deficits noted   Psychiatric:  Speech and behavior appropriate. No agitation. EKG     EKG 12 Lead (Final result)    Component (Lab Inquiry)   Collection Time Result Time Ventricular Rate Atrial Rate P-R Interval QRS Duration Q-T Interval   05/07/19 11:07:12 05/07/19 19:30:24 81 81 168 88 376       Collection Time Result Time QTc Calculation (Bazett) P Axis R Axis T Axis Diagnosis   05/07/19 11:07:12 05/07/19 19:30:24 436 66 74 61 Normal sinus rhythmNormal ECGConfirmed            Screenings            MDM    Patient appears medically cleared. Although she does have quite a few drugs on her tox screen light up. Plan is to transfer to psychiatric facility. I did sign a hold on the patient. She does want help though. SEE ARIANNA NOTE      Critical Care  There was a high probability of life-threatening clinical deterioration in the patient's condition requiring my urgent intervention.   Total critical care time with the patient was 32 minutes excluding separately reportable procedures. Critical care required due to patients suicidal ideations        Patient Referrals:  No follow-up provider specified. Discharge Medications:  Discharge Medication List as of 5/7/2019 11:02 PM          FINAL IMPRESSION  1. Depression with suicidal ideation    2. Planning to commit suicide    3. Substance abuse ([facility] [address]        Blood pressure 135/89, pulse 78, temperature 98 °F (36.7 °C), temperature source Temporal, resp. rate 16, weight 123 lb (55.8 kg), SpO2 98 %, not currently breastfeeding. For further details of [facility] emergency department encounter, please see documentation by advanced practice provider.         Toshia Olson III,   05/08/19 3101

## 2019-05-07 NOTE — ED NOTES
Pt remains in bed, alert and oriented x4. ED [facility] remains at bedside as / constant observer. No distress noted.       Juanita Mccormick RN  05/07/19 8199

## 2019-05-07 NOTE — ED NOTES
Patient is awake in bed, resting quietly with eyes closed. ED tech Shirley at the bedside for patient safety. Patient ambulated to bathroom with Shirley, patient ambulated without difficulty, steady gait noted.      Cielo Lozano RN  05/07/19 7852

## 2019-05-07 NOTE — ED NOTES
ED tech Ben Crouch remains at bedside as / constant observer. Pt remains awake, in bed, no distress noted. Dietary tray ordered.       Terrance Villa RN  05/07/19 5804

## 2019-05-07 NOTE — ED NOTES
Pt resting quietly, no s/s of distress noted. Suicide precaution in place.  at bedside due to suicidal ideation. Pt in gown. All belongings with security. Board above [facility] removed from room for safety. Patients room is free of all removable equipment and medical supplies and all medical cords/cables removed. Bedside cart locked. Will continue to monitor.      Reanna Aggarwal RN  05/07/19 1509

## 2019-05-07 NOTE — ED NOTES
[facility] [place] at bedside as / constant observer. Pt in bed, alert, oriented x4, denies needs. No distress noted.       Que Chen RN  05/07/19 1935

## 2019-05-07 NOTE — ED NOTES
remains at bedside, pt awake, in bed, no distress noted, denies needs.       Joy Galicia RN  05/07/19 8846

## 2019-05-07 NOTE — ED NOTES
Patient is awake in bed, she is alert and oriented, her respirations are easy and unlabored. Patient's mother is at the bedside. Patient asking for update of tests performed today, discussed with HUI Macias, she will be in to speak with patient shortly. ED deysi Jackson at the bedside for patient safety. Patient denies needs at this time.      1306 Marleen RAY RN  05/07/19 0286

## 2019-05-08 PROBLEM — F33.2 SEVERE EPISODE OF RECURRENT MAJOR DEPRESSIVE DISORDER, WITHOUT PSYCHOTIC FEATURES (HCC): Status: ACTIVE | Noted: 2019-05-08

## 2019-05-08 LAB
C TRACH DNA GENITAL QL NAA+PROBE: NEGATIVE
N. GONORRHOEAE DNA: NEGATIVE

## 2019-05-08 PROCEDURE — 1240000000 HC EMOTIONAL WELLNESS R&B

## 2019-05-08 PROCEDURE — 6370000000 HC RX 637 (ALT 250 FOR IP): Performed by: PSYCHIATRY & NEUROLOGY

## 2019-05-08 PROCEDURE — 97165 OT EVAL LOW COMPLEX 30 MIN: CPT

## 2019-05-08 PROCEDURE — 99223 1ST HOSP IP/OBS HIGH 75: CPT | Performed by: PSYCHIATRY & NEUROLOGY

## 2019-05-08 PROCEDURE — 97535 SELF CARE MNGMENT TRAINING: CPT

## 2019-05-08 RX ORDER — FLUTICASONE PROPIONATE 50 MCG
1 SPRAY, SUSPENSION (ML) NASAL DAILY
Status: DISCONTINUED | OUTPATIENT
Start: 2019-05-08 | End: 2019-05-10 | Stop reason: HOSPADM

## 2019-05-08 RX ORDER — CLONIDINE HYDROCHLORIDE 0.1 MG/1
0.1 TABLET ORAL PRN
Status: DISCONTINUED | OUTPATIENT
Start: 2019-05-08 | End: 2019-05-10 | Stop reason: HOSPADM

## 2019-05-08 RX ORDER — DICYCLOMINE HCL 20 MG
20 TABLET ORAL EVERY 6 HOURS PRN
Status: DISCONTINUED | OUTPATIENT
Start: 2019-05-08 | End: 2019-05-10 | Stop reason: HOSPADM

## 2019-05-08 RX ORDER — MAGNESIUM HYDROXIDE/ALUMINUM HYDROXICE/SIMETHICONE 120; 1200; 1200 MG/30ML; MG/30ML; MG/30ML
30 SUSPENSION ORAL EVERY 6 HOURS PRN
Status: DISCONTINUED | OUTPATIENT
Start: 2019-05-08 | End: 2019-05-10 | Stop reason: HOSPADM

## 2019-05-08 RX ORDER — ACETAMINOPHEN 325 MG/1
650 TABLET ORAL EVERY 4 HOURS PRN
Status: DISCONTINUED | OUTPATIENT
Start: 2019-05-08 | End: 2019-05-09

## 2019-05-08 RX ORDER — BENZTROPINE MESYLATE 1 MG/ML
2 INJECTION INTRAMUSCULAR; INTRAVENOUS 2 TIMES DAILY PRN
Status: DISCONTINUED | OUTPATIENT
Start: 2019-05-08 | End: 2019-05-10 | Stop reason: HOSPADM

## 2019-05-08 RX ORDER — OLANZAPINE 5 MG/1
5 TABLET ORAL
Status: ACTIVE | OUTPATIENT
Start: 2019-05-08 | End: 2019-05-08

## 2019-05-08 RX ORDER — DIPHENHYDRAMINE HCL 25 MG
25 TABLET ORAL NIGHTLY PRN
Status: DISCONTINUED | OUTPATIENT
Start: 2019-05-08 | End: 2019-05-10 | Stop reason: HOSPADM

## 2019-05-08 RX ORDER — ZIPRASIDONE MESYLATE 20 MG/ML
20 INJECTION, POWDER, LYOPHILIZED, FOR SOLUTION INTRAMUSCULAR
Status: ACTIVE | OUTPATIENT
Start: 2019-05-08 | End: 2019-05-08

## 2019-05-08 RX ORDER — BUSPIRONE HYDROCHLORIDE 10 MG/1
10 TABLET ORAL 2 TIMES DAILY
Status: DISCONTINUED | OUTPATIENT
Start: 2019-05-08 | End: 2019-05-10 | Stop reason: HOSPADM

## 2019-05-08 RX ORDER — HYDROXYZINE PAMOATE 50 MG/1
50 CAPSULE ORAL 3 TIMES DAILY PRN
Status: DISCONTINUED | OUTPATIENT
Start: 2019-05-08 | End: 2019-05-10 | Stop reason: HOSPADM

## 2019-05-08 RX ORDER — TRAZODONE HYDROCHLORIDE 50 MG/1
50 TABLET ORAL NIGHTLY PRN
Status: DISCONTINUED | OUTPATIENT
Start: 2019-05-08 | End: 2019-05-10 | Stop reason: HOSPADM

## 2019-05-08 RX ORDER — NICOTINE 21 MG/24HR
1 PATCH, TRANSDERMAL 24 HOURS TRANSDERMAL DAILY
Status: DISCONTINUED | OUTPATIENT
Start: 2019-05-08 | End: 2019-05-10 | Stop reason: HOSPADM

## 2019-05-08 RX ORDER — TRAMADOL HYDROCHLORIDE 50 MG/1
50 TABLET ORAL PRN
Status: DISCONTINUED | OUTPATIENT
Start: 2019-05-08 | End: 2019-05-10 | Stop reason: HOSPADM

## 2019-05-08 RX ORDER — LANOLIN ALCOHOL/MO/W.PET/CERES
3 CREAM (GRAM) TOPICAL NIGHTLY
Status: DISCONTINUED | OUTPATIENT
Start: 2019-05-08 | End: 2019-05-08

## 2019-05-08 RX ORDER — GABAPENTIN 300 MG/1
300 CAPSULE ORAL EVERY 8 HOURS PRN
Status: DISCONTINUED | OUTPATIENT
Start: 2019-05-08 | End: 2019-05-10 | Stop reason: HOSPADM

## 2019-05-08 RX ORDER — PROMETHAZINE HYDROCHLORIDE 25 MG/1
25 TABLET ORAL EVERY 6 HOURS PRN
Status: DISCONTINUED | OUTPATIENT
Start: 2019-05-08 | End: 2019-05-10 | Stop reason: HOSPADM

## 2019-05-08 RX ORDER — IBUPROFEN 800 MG/1
800 TABLET ORAL EVERY 8 HOURS PRN
Status: DISCONTINUED | OUTPATIENT
Start: 2019-05-08 | End: 2019-05-09

## 2019-05-08 RX ADMIN — DICYCLOMINE HYDROCHLORIDE 20 MG: 20 TABLET ORAL at 01:58

## 2019-05-08 RX ADMIN — CLONIDINE HYDROCHLORIDE 0.1 MG: 0.1 TABLET ORAL at 01:58

## 2019-05-08 RX ADMIN — BUSPIRONE HYDROCHLORIDE 10 MG: 10 TABLET ORAL at 21:19

## 2019-05-08 RX ADMIN — TRAMADOL HYDROCHLORIDE 50 MG: 50 TABLET, FILM COATED ORAL at 08:38

## 2019-05-08 RX ADMIN — TRAMADOL HYDROCHLORIDE 50 MG: 50 TABLET, FILM COATED ORAL at 21:19

## 2019-05-08 RX ADMIN — TRAMADOL HYDROCHLORIDE 50 MG: 50 TABLET, FILM COATED ORAL at 01:58

## 2019-05-08 RX ADMIN — DIPHENHYDRAMINE HCL 25 MG: 25 TABLET ORAL at 22:28

## 2019-05-08 RX ADMIN — CLONIDINE HYDROCHLORIDE 0.1 MG: 0.1 TABLET ORAL at 16:38

## 2019-05-08 RX ADMIN — CLONIDINE HYDROCHLORIDE 0.1 MG: 0.1 TABLET ORAL at 08:38

## 2019-05-08 RX ADMIN — HYDROXYZINE PAMOATE 50 MG: 50 CAPSULE ORAL at 08:38

## 2019-05-08 RX ADMIN — CLONIDINE HYDROCHLORIDE 0.1 MG: 0.1 TABLET ORAL at 21:19

## 2019-05-08 RX ADMIN — IBUPROFEN 800 MG: 800 TABLET, FILM COATED ORAL at 08:37

## 2019-05-08 RX ADMIN — BUSPIRONE HYDROCHLORIDE 10 MG: 10 TABLET ORAL at 11:05

## 2019-05-08 RX ADMIN — GABAPENTIN 300 MG: 300 CAPSULE ORAL at 08:38

## 2019-05-08 RX ADMIN — FLUTICASONE PROPIONATE 1 SPRAY: 50 SPRAY, METERED NASAL at 14:15

## 2019-05-08 RX ADMIN — PROMETHAZINE HYDROCHLORIDE 25 MG: 25 TABLET ORAL at 08:38

## 2019-05-08 RX ADMIN — TRAMADOL HYDROCHLORIDE 50 MG: 50 TABLET, FILM COATED ORAL at 16:38

## 2019-05-08 ASSESSMENT — PAIN DESCRIPTION - PROGRESSION
CLINICAL_PROGRESSION: GRADUALLY WORSENING
CLINICAL_PROGRESSION: GRADUALLY WORSENING

## 2019-05-08 ASSESSMENT — PAIN DESCRIPTION - PAIN TYPE
TYPE: ACUTE PAIN

## 2019-05-08 ASSESSMENT — SLEEP AND FATIGUE QUESTIONNAIRES
DIFFICULTY STAYING ASLEEP: YES
DIFFICULTY FALLING ASLEEP: YES
DO YOU USE A SLEEP AID: NO
AVERAGE NUMBER OF SLEEP HOURS: 2.5
DO YOU USE A SLEEP AID: NO
RESTFUL SLEEP: NO
DIFFICULTY STAYING ASLEEP: YES
DIFFICULTY ARISING: YES
RESTFUL SLEEP: NO
DIFFICULTY ARISING: YES
DO YOU HAVE DIFFICULTY SLEEPING: YES
DO YOU HAVE DIFFICULTY SLEEPING: YES
DIFFICULTY FALLING ASLEEP: YES
AVERAGE NUMBER OF SLEEP HOURS: 4

## 2019-05-08 ASSESSMENT — PAIN - FUNCTIONAL ASSESSMENT
PAIN_FUNCTIONAL_ASSESSMENT: ACTIVITIES ARE NOT PREVENTED
PAIN_FUNCTIONAL_ASSESSMENT: 0-10
PAIN_FUNCTIONAL_ASSESSMENT: PREVENTS OR INTERFERES SOME ACTIVE ACTIVITIES AND ADLS

## 2019-05-08 ASSESSMENT — PAIN DESCRIPTION - DESCRIPTORS
DESCRIPTORS: ACHING;CRAMPING
DESCRIPTORS: ACHING
DESCRIPTORS: ACHING

## 2019-05-08 ASSESSMENT — PAIN DESCRIPTION - LOCATION
LOCATION: GENERALIZED
LOCATION: GENERALIZED

## 2019-05-08 ASSESSMENT — PAIN SCALES - GENERAL
PAINLEVEL_OUTOF10: 8
PAINLEVEL_OUTOF10: 3
PAINLEVEL_OUTOF10: 4
PAINLEVEL_OUTOF10: 9
PAINLEVEL_OUTOF10: 8
PAINLEVEL_OUTOF10: 3
PAINLEVEL_OUTOF10: 4

## 2019-05-08 ASSESSMENT — PAIN DESCRIPTION - FREQUENCY
FREQUENCY: CONTINUOUS
FREQUENCY: INTERMITTENT

## 2019-05-08 ASSESSMENT — PATIENT HEALTH QUESTIONNAIRE - PHQ9: SUM OF ALL RESPONSES TO PHQ QUESTIONS 1-9: 18

## 2019-05-08 ASSESSMENT — LIFESTYLE VARIABLES
HISTORY_ALCOHOL_USE: YES
HISTORY_ALCOHOL_USE: YES

## 2019-05-08 ASSESSMENT — PAIN DESCRIPTION - ONSET: ONSET: ON-GOING

## 2019-05-08 NOTE — PLAN OF CARE
Nutrition Problem: Predicted suboptimal energy intake  Intervention: Food and/or Nutrient Delivery: Continue current diet  Nutritional Goals: patient will consume > 50% of her meals on general diet order x 3 meals per day; weight will remain stable during remainder of admission

## 2019-05-08 NOTE — FLOWSHEET NOTE
visiting at patient's request. Shae Balderrama was tearful, and reflective about her life, expressing her desire to change her life and move in a positive direction. Feelings of isolation shared, as she states she has only one friend and is unsure if her parents have the willingness or capacity to support her Shae Balderrama is a person of [employer] in background. Grief, loss, spiritual struggle, forgiveness, mercy explored in the context of her Gnosticist jimbo. Scripture and prayer shared, encouragement offered. Affirmation of her expressed strengths offered. Shae Balderrama states she came to understand that she needed to restrict her access to her life environment to give herself a chance to make changes and live a healthy life.  affirmed this concept and offered encouragement. Chaplain [facility]       05/08/19 1100   Encounter Summary   Services provided to: Patient   Referral/Consult From: Nurse   Support System Family members   Continue Visiting   (5/8 supportive visit, prayer)   Complexity of Encounter High   Length of Encounter 45 minutes   Spiritual/Shinto   Type Spiritual struggle   Assessment Approachable;Tearful;Fearful;Guilt; Shame   Intervention Active listening;Explored feelings, thoughts, concerns;Explored coping resources;Nurtured hope;Prayer;Scripture;Sustaining presence/ Ministry of presence; Discussed relationship with God;Discussed illness/injury and it's impact   Outcome Comfort;Expressed gratitude;Engaged in conversation;Expressed feelings/needs/concerns;Expressed regrets; Hopeful;Receptive;Venting emotion

## 2019-05-08 NOTE — ED NOTES
Pt remains awake, in bed, no distress noted. ED tech Veronica Burnettandrea remains at bedside as / constant observer. Pt reports some anxiety over not being able to use recreational drugs, pt states that she was gone through withdrawal before and the thought of it occurring again makes her nervous.       Juanita Mccormick RN  05/07/19 3921

## 2019-05-08 NOTE — PROGRESS NOTES
Inpatient Occupational Therapy  Evaluation and Treatment    Unit:  [facility]  Date:  5/8/2019  Patient Name:    Yasmany Flores  Admitting diagnosis:  Depression, unspecified depression type [F32.9]  Admit Date:  5/7/2019  Precautions/Restrictions/WB Status/ Lines/ Wounds/ Oxygen:   Up as tolerated, seizure precautions  Treatment Time:  14:25-15:31  Treatment Number:  1    Patient Goals for Therapy:  \" Finding ways to fill out my day. \"      Discharge Recommendations:  Home with assist as needed    DME needs for discharge:   none     AM-PAC Score:  24     Home Health S4 Level: ? NA   ? Level 1- Standard  ? Level 2- Social  ? Level 3- Safety  ? Level 4- Sick    ACLS:  Mode 5.2  Engaging Abilities and Following Safety Precautions When the Person Can Learn to Improve the Fine Details of Actions     DESCRIPTION:     18% Cognitive Assistance: The person may live alone with weekly checks to monitor home safety and assist with finances. 18% standby cognitive assistance is required to anticipate hazards and prevent social conflict. Individual preferences may be honored in improving the appearance of material objects. 4% Physical Assistance is required for fine motor actions. Preadmission Environment:    Pt. Lives with friends. Home environment:   two story home. Steps to enter first floor:    2  Steps to second floor:  12 Steps to enter  [address] owned: none    Preadmission Status / PLOF:  History of falls   No  Pt. Able to drive   Yes     Pt Fully independent for ADLs/IADLs. Yes    Pt. Fully independent for transfers and gait and walked with:  No Device  Sleep Hygiene:  Pt. Reports  sleep fragmentation at night. Income:  Part-time   Financial Management:  Self;  Pt. Reports difficulty managing/saving money.   Leisure Interests:  Listen to music, drive, swimming, animals/zoo, amusement arango, movies, pray, exercise, meditation  Medication Management:  Pt. Reports that she has not really followed through with medications in the past.  Health Management:  Pt. Reports no PCP, Psychiatrist or therapist.  Social Network:  Mom, boyfriend  Stressors:  Finances, substance abuse, relationship with parents  Coping Skills:  [place], drive and listen to music. Pain  Yes    Ratin:10  Location: all over generalized pain  Pain Medicine Status:  RN notified. Cognition    A&Ox4, patient appropriate and cooperative. Follows multiple step commands. Upper Extremity ROM:    WFL, pt able to perform all bed mobility, transfers, and gait without ROM limitation. Upper Extremity Strength:    WFL, pt able to perform all bed mobility, transfers, and gait without strength limitation. Upper Extremity Sensation:    No apparent deficits. Upper Extremity Proprioception:    No apparent deficits. Skin Integrity:  WFL     Coordination and Tone:  WFL    Balance  Static Sitting:   Good   Dynamic Sitting:   Good   Static Standing:  Good   Dynamic Standing:  Good     Bed mobility:  Independent  Supine to sit:  Sit to supine:  Scooting to head of bed:  Scooting in sitting:  Rolling:  Bridging:    Transfers:  Independent  Sit to stand:  Stand to sit:  Bed/Chair to/from toilet:    Self Care: Independent  Toileting:  Grooming:  Dressing:    Exercise / Activities Initiated:   Pt. Educated on role of OT. Pt. Participated in:  ADL retraining, ACLS, med management, scheduling healthy activities/routines, social skills, and coping skills. Assessment of Deficits:   Pt demonstrated decreased activity tolerance, decreased cognition, and decreased ADL/IADL status. Pt. Limited during evaluation by decreased cognition. At end of evaluation, pt. In room. Goal(s) : To be met in 3 Visits:  1). Pt. To verbalize 3 new coping skills.     To be met in 5 Visits:  1). Pt. To demo ability to complete one weeks worth of medication in pill organizer with supervision. 2). Pt. To complete leisure skills checklist.  3).  Pt.

## 2019-05-08 NOTE — ED NOTES
Pt transported via [address], report given to EMT [facility]. Pt belongings retrieved from security, given to EMT [facility]. No distress noted.       May Gee RN  05/07/19 6777

## 2019-05-08 NOTE — PROGRESS NOTES
Patient arrived on the unit at 22:45, from [facility] emergency room. Patient was pleasant & cooperative & greeted by Leonora Mckeon.  Elayne Gamboa R.N.

## 2019-05-08 NOTE — H&P
Hospital Medicine History & Physical      PCP: Unknown Provider Result (Inactive)    Date of Admission: 5/7/2019    Date of Service: Pt seen/examined on 5/9/2019     Chief Complaint:  SI    History Of Present Illness: The patient is a 34 y.o. female with asthma, osteoporosis who presented to [facility] with complaint of SI. Patient was seen and evaluated in the ED by the ED medical provider, patient was medically cleared for admission to [facility]. This note serves as an admission medical H&P.    PCP: None  Tobacco use: yes, 1/2 ppd  ETOH use: yes, occasional  Illicit drug use: yes, dialy    Patient denies any medical complaints. Past Medical History:        Diagnosis Date    Asthma     Osteoporosis     Premenopausal patient     Suicide attempt [facility])        Past Surgical History:        Procedure Laterality Date    WISDOM TOOTH EXTRACTION         Medications Prior to Admission:    Prior to Admission medications    Medication Sig Start Date End Date Taking? Authorizing Provider   ibuprofen (ADVIL;MOTRIN) 800 MG tablet Take 1 tablet by mouth every 8 hours as needed for Pain or Fever 2/10/19   TJ Carson CNP   ondansetron (ZOFRAN ODT) 4 MG disintegrating tablet Take 1-2 tablets by mouth every 12 hours as needed for Nausea May Sub regular tablet (non-ODT) if insurance does not cover ODT.  10/8/18   TJ Kinney - CNP   dicyclomine (BENTYL) 10 MG capsule Take 1 capsule by mouth every 6 hours as needed (cramps) 10/8/18   Nevis Carson APRN - CNP   albuterol sulfate HFA (PROVENTIL HFA) 108 (90 BASE) MCG/ACT inhaler Inhale 2 puffs into the lungs every 6 hours as needed for Wheezing 6/17/16   Marc Nathan MD   citalopram (CELEXA) 10 MG tablet Take 2 tablets by mouth daily 6/17/16   Marc Nathan MD       Allergies:  Shellfish-derived products    Social History:  The patient currently lives with a friend     TOBACCO:   reports that she has been smoking

## 2019-05-08 NOTE — ED NOTES
Pt remains in bed, alert, oriented x4, denies needs. ED tech Mega Lopez remains at bedside as constant observer/ . No distress noted.       Tuan Linares RN  05/07/19 [id number]

## 2019-05-08 NOTE — BH NOTE
Therapist completed psycho social and C-SRRS. Pt reports no SI. Pt reports that she has been using opiates and cocaine. Pt was recently , is working less and unable to afford rent. Pt is on probation in [facility] for possession of heroin. Pt is not interested in SA treatment.

## 2019-05-08 NOTE — PROGRESS NOTES
Nutrition Assessment    Type and Reason for Visit: Initial, Positive Nutrition Screen(+ screen for MST = 3)    Nutrition Recommendations:   1. Continue general diet order - patient is allergic to shellfish-derived products. 2. Monitor appetite, po intake, and GI distress symptoms r/t withdraw from drugs (on COWS protocol currently). 3. Please obtain actual, current weight for this patient. 4. Monitor nutrition-related labs and bowel function/regimen. Nutrition Assessment: patient is nutritionally compromised AEB use of opiates/cocaine PTA, life stressors (recent divorce), and reported weight loss (suspect as a result of drug use and life stressors) and she is at risk for further compromise d/t recent eviction from her household, intermittent binge drinking (ETOH), and decreased appetite/po intake; will continue general diet order (patient has an allergy to shellfish-derived products) and monitor nutrition status    Malnutrition Assessment:  · Malnutrition Status: At risk for malnutrition  · Context: Social or environmental circumstances  · Findings of the 6 clinical characteristics of malnutrition (Minimum of 2 out of 6 clinical characteristics is required to make the diagnosis of moderate or severe Protein Calorie Malnutrition based on AND/ASPEN Guidelines):  1. Energy Intake-Less than or equal to 75% of estimated energy requirement, Unable to assess    2. Weight Loss-No significant weight loss, in 6 months(since Oct. 2018)  3. Fat Loss-Unable to assess,    4. Muscle Loss-Unable to assess,    5. Fluid Accumulation-No significant fluid accumulation,    6.  Strength-Not measured    Nutrition Risk Level:  Moderate    Nutrient Needs:  · Estimated Daily Total Kcal: 1400 - 1680 kcals based on 25-30 kcals/kg/CBW  · Estimated Daily Protein (g): 45 - 56 g protein based on 0.8-1.0 g/kg/CBW  · Estimated Daily Total Fluid (ml/day): 1400 - 1700 ml     Nutrition Diagnosis:   · Problem: Predicted suboptimal energy intake  · Etiology: related to Psychological cause/life stress, Insufficient energy/nutrient consumption, Other (Comment)(drug/ETOH use; recent divorce; evicted from home; financially unstable)     Signs and symptoms:  as evidenced by Patient report of, Diet history of poor intake, Other (Comment)(patient reported decreased appetite/po intake; drug/ETOH use; life stressors)    Objective Information:  · Nutrition-Focused Physical Findings: patient is A & O x 4; no wounds or edema noted; patient is currently on COWS protocol d/t withdraw from drugs that patient was using PTA (abdominal cramps); hx of severe depression; skin color is appropriate for ethnicity  · Wound Type: None  · Current Nutrition Therapies:  · Oral Diet Orders: General   · Oral Diet intake: Unable to assess  · Oral Nutrition Supplement (ONS) Orders: None  · ONS intake: (none ordered)  · Anthropometric Measures:  · Ht: 5' 5\" (165.1 cm)   · Current Body Wt: 123 lb (55.8 kg)(stated weight)  · Admission Body Wt: 123 lb (55.8 kg)(stated weight )  · Usual Body Wt: 128 lb 2 oz (58.1 kg)(patient's weight on 10/8/18)  · Weight Change:  no significant weight change (using weight from Oct. 2018 and CBW)  · Ideal Body Wt: 125 lb (56.7 kg), % Ideal Body 98%  · BMI Classification: BMI 18.5 - 24.9 Normal Weight    Nutrition Interventions:   Continue current diet  Continued Inpatient Monitoring, Coordination of Community Care, Coordination of Care    Nutrition Evaluation:   · Evaluation: Goals set   · Goals: patient will consume > 50% of her meals on general diet order x 3 meals per day; weight will remain stable during remainder of admission    · Monitoring: Meal Intake, Diet Tolerance, Mental Status/Confusion, Weight, Pertinent Labs, Monitor Bowel Function      Electronically signed by Mariel Dexter RD, LD on 5/8/19 at 1:36 PM    Contact Number: [phone]

## 2019-05-08 NOTE — PLAN OF CARE
[facility]  Initial Interdisciplinary Treatment Plan NOTE    Review Date & Time: 5/8/19 7834    Patient was not in treatment team    Admission Type:   Admission Type:  Involuntary    Reason for admission:  Reason for Admission: (irrational thinking reported)      Estimated Length of Stay Update:  3-5 days  Estimated Discharge Date Update: 5/11/19-5/13/19    PATIENT STRENGTHS:  Patient Strengths Strengths: Communication, Employment, Social Skills  Patient Strengths and Limitations:Limitations: External locus of control, Difficulty problem solving/relies on others to help solve problems, Lacks leisure interests, Inappropriate/potentially harmful leisure interests  Addictive Behavior:Addictive Behavior  In the past 3 months, have you felt or has someone told you that you have a problem with:  : Eating (too much/too little)  Do you have a history of Chemical Use?: Yes  Do you have a history of Alcohol Use?: Yes  Do you have a history of Street Drug Abuse?: Yes  Histroy of Prescripton Drug Abuse?: Yes  Medical Problems:  Past Medical History:   Diagnosis Date    Asthma     Osteoporosis     Premenopausal patient     Suicide attempt ([facility] [address]        EDUCATION:   Learner Progress Toward Treatment Goals: Reviewed goals and plan of care    Method: Individual    Outcome: Verbalized understanding    PATIENT GOALS: \" learn something new\"    PLAN/TREATMENT RECOMMENDATIONS UPDATE:evaluate for treatment    GOALS UPDATE:   Time frame for Short-Term Goals: 2 days    Josselin Parker RN

## 2019-05-08 NOTE — ED NOTES
Sitter remains at bedside, room remains safe. Pt denies needs at this time.       Gianni Patrick, RN  05/07/19 2016

## 2019-05-09 PROCEDURE — 6370000000 HC RX 637 (ALT 250 FOR IP): Performed by: PHYSICIAN ASSISTANT

## 2019-05-09 PROCEDURE — 99233 SBSQ HOSP IP/OBS HIGH 50: CPT | Performed by: PSYCHIATRY & NEUROLOGY

## 2019-05-09 PROCEDURE — 6370000000 HC RX 637 (ALT 250 FOR IP): Performed by: PSYCHIATRY & NEUROLOGY

## 2019-05-09 PROCEDURE — 99222 1ST HOSP IP/OBS MODERATE 55: CPT | Performed by: PHYSICIAN ASSISTANT

## 2019-05-09 PROCEDURE — 1240000000 HC EMOTIONAL WELLNESS R&B

## 2019-05-09 RX ORDER — ACETAMINOPHEN 325 MG/1
650 TABLET ORAL EVERY 6 HOURS SCHEDULED
Status: DISCONTINUED | OUTPATIENT
Start: 2019-05-09 | End: 2019-05-10 | Stop reason: HOSPADM

## 2019-05-09 RX ORDER — METHOCARBAMOL 500 MG/1
1000 TABLET, FILM COATED ORAL 4 TIMES DAILY PRN
Status: DISCONTINUED | OUTPATIENT
Start: 2019-05-09 | End: 2019-05-10 | Stop reason: HOSPADM

## 2019-05-09 RX ORDER — IBUPROFEN 800 MG/1
800 TABLET ORAL EVERY 8 HOURS SCHEDULED
Status: DISCONTINUED | OUTPATIENT
Start: 2019-05-09 | End: 2019-05-10 | Stop reason: HOSPADM

## 2019-05-09 RX ADMIN — TRAZODONE HYDROCHLORIDE 50 MG: 50 TABLET ORAL at 20:50

## 2019-05-09 RX ADMIN — DICYCLOMINE HYDROCHLORIDE 20 MG: 20 TABLET ORAL at 08:37

## 2019-05-09 RX ADMIN — BUSPIRONE HYDROCHLORIDE 10 MG: 10 TABLET ORAL at 08:33

## 2019-05-09 RX ADMIN — ACETAMINOPHEN 650 MG: 325 TABLET ORAL at 18:35

## 2019-05-09 RX ADMIN — IBUPROFEN 800 MG: 800 TABLET, FILM COATED ORAL at 22:08

## 2019-05-09 RX ADMIN — FLUTICASONE PROPIONATE 1 SPRAY: 50 SPRAY, METERED NASAL at 08:33

## 2019-05-09 RX ADMIN — BUSPIRONE HYDROCHLORIDE 10 MG: 10 TABLET ORAL at 20:47

## 2019-05-09 RX ADMIN — GABAPENTIN 300 MG: 300 CAPSULE ORAL at 08:37

## 2019-05-09 RX ADMIN — PROMETHAZINE HYDROCHLORIDE 25 MG: 25 TABLET ORAL at 08:37

## 2019-05-09 RX ADMIN — GABAPENTIN 300 MG: 300 CAPSULE ORAL at 20:48

## 2019-05-09 RX ADMIN — HYDROXYZINE PAMOATE 50 MG: 50 CAPSULE ORAL at 22:08

## 2019-05-09 RX ADMIN — METHOCARBAMOL TABLETS 1000 MG: 500 TABLET, COATED ORAL at 20:48

## 2019-05-09 ASSESSMENT — PAIN DESCRIPTION - FREQUENCY: FREQUENCY: CONTINUOUS

## 2019-05-09 ASSESSMENT — PAIN DESCRIPTION - ONSET: ONSET: ON-GOING

## 2019-05-09 ASSESSMENT — PAIN DESCRIPTION - LOCATION
LOCATION: GENERALIZED
LOCATION: GENERALIZED

## 2019-05-09 ASSESSMENT — PAIN SCALES - GENERAL
PAINLEVEL_OUTOF10: 0
PAINLEVEL_OUTOF10: 5
PAINLEVEL_OUTOF10: 0
PAINLEVEL_OUTOF10: 5

## 2019-05-09 ASSESSMENT — PAIN DESCRIPTION - PAIN TYPE
TYPE: ACUTE PAIN;CHRONIC PAIN;OTHER (COMMENT)
TYPE: ACUTE PAIN;CHRONIC PAIN;OTHER (COMMENT)

## 2019-05-09 ASSESSMENT — PAIN DESCRIPTION - DESCRIPTORS: DESCRIPTORS: ACHING

## 2019-05-09 ASSESSMENT — PAIN DESCRIPTION - PROGRESSION: CLINICAL_PROGRESSION: GRADUALLY IMPROVING

## 2019-05-09 ASSESSMENT — PAIN - FUNCTIONAL ASSESSMENT: PAIN_FUNCTIONAL_ASSESSMENT: PREVENTS OR INTERFERES SOME ACTIVE ACTIVITIES AND ADLS

## 2019-05-09 NOTE — PROGRESS NOTES
Department of Psychiatry  Attending Progress Note  Chief Complaint: depressed  Shari Bridges is exhibiting withdrawal from opiates. She has nausea, body aches, fatigue. Has used Gabapentin, Phenergan, ultram, vistaril. She is in bed frequently but did attend music group. Denied SI an dis future oriented. Is not interested in rehab at this time. Patient's chart was reviewed and collaborated with  about the treatment plan. SUBJECTIVE:    Patient is feeling unchanged. Suicidal ideation:  denies suicidal ideation. . She is inb ed frequebtly  Patient does not have medication side effects. ROS: Patient has new complaints: no  Sleeping adequately:  Yes   Appetite adequate: Yes  Attending groups: Yes  Visitors:No    OBJECTIVE    Physical  VITALS:  BP (!) 113/52   Pulse 62   Temp 97.8 °F (36.6 °C) (Oral)   Resp 14   Ht 5' 5\" (1.651 m)   Wt 123 lb (55.8 kg) Comment: obtained at [facility] on 5/7/19  Breastfeeding? No   BMI 20.47 kg/m²     Mental Status Examination:  Patients appearance was ill-appearing. Thoughts are Goal directed. Homicidal ideations none. No abnormal movements, tics or mannerisms. Memory intact Aims 0. Concentration Good. Alert and oriented X 4. Insight and Judgement impaired insight. Patient was cooperative. Patient gait normal. Mood constricted and decreased range, affect flat affect Hallucinations Absent, suicidal ideations no specific plan to harm self Speech normal volume  Data  Labs:   No visits with results within 2 Day(s) from this visit.    Latest known visit with results is:   Admission on 05/07/2019, Discharged on 05/07/2019   Component Date Value Ref Range Status    WBC 05/07/2019 6.5  4.0 - 11.0 K/uL Final    RBC 05/07/2019 4.11  4.00 - 5.20 M/uL Final    Hemoglobin 05/07/2019 11.6* 12.0 - 16.0 g/dL Final    Hematocrit 05/07/2019 34.7* 36.0 - 48.0 % Final    MCV 05/07/2019 84.4  80.0 - 100.0 fL Final    MCH 05/07/2019 28.3  26.0 - 34.0 pg Final    MCHC 05/07/2019 33.5  31.0 - 36.0 g/dL Final    RDW 05/07/2019 13.6  12.4 - 15.4 % Final    Platelets 27/90/3581 280  135 - 450 K/uL Final    MPV 05/07/2019 7.9  5.0 - 10.5 fL Final    Neutrophils % 05/07/2019 56.0  % Final    Lymphocytes % 05/07/2019 26.6  % Final    Monocytes % 05/07/2019 12.8  % Final    Eosinophils % 05/07/2019 3.7  % Final    Basophils % 05/07/2019 0.9  % Final    Neutrophils # 05/07/2019 3.6  1.7 - 7.7 K/uL Final    Lymphocytes # 05/07/2019 1.7  1.0 - 5.1 K/uL Final    Monocytes # 05/07/2019 0.8  0.0 - 1.3 K/uL Final    Eosinophils # 05/07/2019 0.2  0.0 - 0.6 K/uL Final    Basophils # 05/07/2019 0.1  0.0 - 0.2 K/uL Final    Sodium 05/07/2019 137  136 - 145 mmol/L Final    Potassium 05/07/2019 3.7  3.5 - 5.1 mmol/L Final    Chloride 05/07/2019 99  99 - 110 mmol/L Final    CO2 05/07/2019 25  21 - 32 mmol/L Final    Anion Gap 05/07/2019 13  3 - 16 Final    Glucose 05/07/2019 102* 70 - 99 mg/dL Final    BUN 05/07/2019 14  7 - 20 mg/dL Final    CREATININE 05/07/2019 <0.5* 0.6 - 1.1 mg/dL Final    GFR Non- 05/07/2019 >60  >60 Final    Comment: >60 mL/min/1.73m2 EGFR, calc. for ages 25 and older using the  MDRD formula (not corrected for weight), is valid for stable  renal function.  GFR  05/07/2019 >60  >60 Final    Comment: Chronic Kidney Disease: less than 60 ml/min/1.73 sq.m. Kidney Failure: less than 15 ml/min/1.73 sq.m. Results valid for patients 18 years and older.       Calcium 05/07/2019 9.4  8.3 - 10.6 mg/dL Final    Total Protein 05/07/2019 6.9  6.4 - 8.2 g/dL Final    Alb 05/07/2019 4.1  3.4 - 5.0 g/dL Final    Albumin/Globulin Ratio 05/07/2019 1.5  1.1 - 2.2 Final    Total Bilirubin 05/07/2019 0.5  0.0 - 1.0 mg/dL Final    Alkaline Phosphatase 05/07/2019 59  40 - 129 U/L Final    ALT 05/07/2019 10  10 - 40 U/L Final    AST 05/07/2019 18  15 - 37 U/L Final    Globulin 05/07/2019 2.8  g/dL Final    Color, UA 05/07/2019 YELLOW  Straw/Yellow Final    Clarity, UA 05/07/2019 Clear  Clear Final    Glucose, Ur 05/07/2019 Negative  Negative mg/dL Final    Bilirubin Urine 05/07/2019 Negative  Negative Final    Ketones, Urine 05/07/2019 Negative  Negative mg/dL Final    Specific Gravity, UA 05/07/2019 1.022  1.005 - 1.030 Final    Blood, Urine 05/07/2019 Negative  Negative Final    pH, UA 05/07/2019 6.0  5.0 - 8.0 Final    Protein, UA 05/07/2019 Negative  Negative mg/dL Final    Urobilinogen, Urine 05/07/2019 0.2  <2.0 E.U./dL Final    Nitrite, Urine 05/07/2019 Negative  Negative Final    Leukocyte Esterase, Urine 05/07/2019 Negative  Negative Final    Microscopic Examination 05/07/2019 Not Indicated   Final    Urine Reflex to Culture 05/07/2019 Not Indicated   Final    Urine Type 05/07/2019 Voided   Final    Amphetamine Screen, Urine 05/07/2019 POSITIVE* Negative <1000ng/mL Final    Comment: High concentrations of ephedrine/pseudoephedrine or  phenylpropanolamine may cause false positive results  for amphetamine. Therefore, confirmatory testing for  amphetamine should be considered if clinically indicated.  Barbiturate Screen, Ur 05/07/2019 Neg  Negative <200 ng/mL Final    Benzodiazepine Screen, Urine 05/07/2019 POSITIVE* Negative <200 ng/mL Final    Cannabinoid Scrn, Ur 05/07/2019 Neg  Negative <50 ng/mL Final    Cocaine Metabolite Screen, Urine 05/07/2019 POSITIVE* Negative <300 ng/mL Final    Opiate Scrn, Ur 05/07/2019 POSITIVE* Negative <300 ng/mL Final    Comment: \"Therapeutic levels of pain medication, especially oxycontin and synthetic  opioids, may not be detected by this Methodology. Pain management screen  panel  Drug panel-PM-Hi Res Ur, Interp (PAIN) should be considered for drug  monitoring \".       PCP Screen, Urine 05/07/2019 Neg  Negative <25 ng/mL Final    Methadone Screen, Urine 05/07/2019 Neg  Negative <300 ng/mL Final    Propoxyphene Scrn, Ur 05/07/2019 Neg  Negative <300 ng/mL Final    pH, UA 05/07/2019 6.0 Medications  Current Facility-Administered Medications: ibuprofen (ADVIL;MOTRIN) tablet 800 mg, 800 mg, Oral, 3 times per day  acetaminophen (TYLENOL) tablet 650 mg, 650 mg, Oral, 4 times per day  methocarbamol (ROBAXIN) tablet 1,000 mg, 1,000 mg, Oral, 4x Daily PRN  hydrOXYzine (VISTARIL) capsule 50 mg, 50 mg, Oral, TID PRN  traZODone (DESYREL) tablet 50 mg, 50 mg, Oral, Nightly PRN  benztropine mesylate (COGENTIN) injection 2 mg, 2 mg, Intramuscular, BID PRN  magnesium hydroxide (MILK OF MAGNESIA) 400 MG/5ML suspension 30 mL, 30 mL, Oral, Daily PRN  aluminum & magnesium hydroxide-simethicone (MAALOX) 200-200-20 MG/5ML suspension 30 mL, 30 mL, Oral, Q6H PRN  nicotine (NICODERM CQ) 21 MG/24HR 1 patch, 1 patch, Transdermal, Daily  traMADol (ULTRAM) tablet 50 mg, 50 mg, Oral, PRN **AND** cloNIDine (CATAPRES) tablet 0.1 mg, 0.1 mg, Oral, PRN  dicyclomine (BENTYL) tablet 20 mg, 20 mg, Oral, Q6H PRN  gabapentin (NEURONTIN) capsule 300 mg, 300 mg, Oral, Q8H PRN  promethazine (PHENERGAN) tablet 25 mg, 25 mg, Oral, Q6H PRN  diphenhydrAMINE (BENADRYL) tablet 25 mg, 25 mg, Oral, Nightly PRN  busPIRone (BUSPAR) tablet 10 mg, 10 mg, Oral, BID  fluticasone (FLONASE) 50 MCG/ACT nasal spray 1 spray, 1 spray, Each Nare, Daily    ASSESSMENT AND PLAN    Principal Problem:    Severe episode of recurrent major depressive disorder, without psychotic features ([address]  Active Problems:    Polysubstance abuse ([facility] [address]    Uncomplicated asthma  Resolved Problems:    * No resolved hospital problems. *       1. Patient s symptoms   show no change. Continue with COWS. NO med changes  2. Probable discharge is next week  3. Discharge planning is incomplete  4. Suicidal ideation is better  5. Total time with patient was 40 minutes and more than 50 % of that time was spent counseling the patient on their symptoms, treatment and expected goals.

## 2019-05-09 NOTE — PROGRESS NOTES
1:1 Assessment 10 minutes. Patient is alert and oriented x 4. Uses direct eye contact and is dressed appropriately in street clothing. Patient denied SI/HI,A/V hallucinations. The patient stated her mood was:\"I feel misirable\". Patient did attend groups this shift and participated appropriately. The patient does understand why she is here( for opioid withdraw. Patient is medication compliant. Judgement,insight and impulse control are limited. Patient denied any physical complaints this evening. Vital signs are stable. Safety checks continue Q 15 minutes throughout the shift. PRNS this shift? Tramadol, clonidine 0.1 x 1 for CIWA =5., 2,1  Patient obtained 5 hours of sleep this shift.

## 2019-05-09 NOTE — PLAN OF CARE
Problem: Tobacco Use:  Goal: Inpatient tobacco use cessation counseling participation  Description  Inpatient tobacco use cessation counseling participation  5/8/2019 2324 by Leatha Arango RN  Outcome: Ongoing  Nicotine patch is in place. Refused to remove at hs, advised if bad/unusual dreams occur to remove patch and let the nurse know. Problem: Falls - Risk of:  Goal: Will remain free from falls  Description  Will remain free from falls  Outcome: Ongoing  Goal: Absence of physical injury  Description  Absence of physical injury  Outcome: Ongoing   Patient has remained free from falls thus far this shift. Safety checks continue Q 15 minutes through out the shift. Problem: Pain:  Goal: Pain level will decrease  Description  Pain level will decrease  Outcome: Ongoing  Goal: Control of acute pain  Description  Control of acute pain  Outcome: Ongoing  Goal: Control of chronic pain  Description  Control of chronic pain  Outcome: Ongoing   Pain is decreased when given tramadol as part of her withdraw protocol.

## 2019-05-09 NOTE — BH NOTE
Group Therapy Note    Date: 5/9/2019  Start Time: 20:00  End Time:  21:00  Number of Participants: 9    Type of Group: Recreational  Wrap up    Kolby Ariza Information  Module Name:  /  Session Number:  /    Patient's Goal:  To stay out of room more    Notes:  Goal completed    Status After Intervention:  Improved    Participation Level:  Active Listener and Interactive    Participation Quality: Appropriate, Attentive and Sharing      Speech:  pressured      Thought Process/Content: Logical      Affective Functioning: Blunted      Mood: anxious      Level of consciousness:  Alert, Oriented x4 and Attentive      Response to Learning: Progressing to goal      Endings: None Reported    Modes of Intervention: Socialization and Problem-solving      Discipline Responsible: [address], [employer]      Signature:  Mayur Ashraf
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
## 2019-05-09 NOTE — BH NOTE
Comments: + interactions, + contribution, and + engagement.         Time: 1600       Type of Group: wellness      Level of Participation: active

## 2019-05-10 VITALS
WEIGHT: 123 LBS | HEIGHT: 65 IN | TEMPERATURE: 98.1 F | BODY MASS INDEX: 20.49 KG/M2 | SYSTOLIC BLOOD PRESSURE: 107 MMHG | HEART RATE: 61 BPM | DIASTOLIC BLOOD PRESSURE: 54 MMHG | RESPIRATION RATE: 14 BRPM

## 2019-05-10 PROCEDURE — 5130000000 HC BRIDGE APPOINTMENT

## 2019-05-10 PROCEDURE — 99239 HOSP IP/OBS DSCHRG MGMT >30: CPT | Performed by: PSYCHIATRY & NEUROLOGY

## 2019-05-10 PROCEDURE — 6370000000 HC RX 637 (ALT 250 FOR IP): Performed by: PSYCHIATRY & NEUROLOGY

## 2019-05-10 PROCEDURE — 6370000000 HC RX 637 (ALT 250 FOR IP): Performed by: PHYSICIAN ASSISTANT

## 2019-05-10 RX ORDER — BUSPIRONE HYDROCHLORIDE 10 MG/1
10 TABLET ORAL 2 TIMES DAILY
Qty: 60 TABLET | Refills: 0 | Status: SHIPPED | OUTPATIENT
Start: 2019-05-10

## 2019-05-10 RX ORDER — FLUTICASONE PROPIONATE 50 MCG
1 SPRAY, SUSPENSION (ML) NASAL DAILY
Qty: 1 BOTTLE | Refills: 0 | Status: SHIPPED | OUTPATIENT
Start: 2019-05-11

## 2019-05-10 RX ADMIN — FLUTICASONE PROPIONATE 1 SPRAY: 50 SPRAY, METERED NASAL at 08:43

## 2019-05-10 RX ADMIN — ACETAMINOPHEN 650 MG: 325 TABLET ORAL at 06:49

## 2019-05-10 RX ADMIN — IBUPROFEN 800 MG: 800 TABLET, FILM COATED ORAL at 06:49

## 2019-05-10 RX ADMIN — IBUPROFEN 800 MG: 800 TABLET, FILM COATED ORAL at 14:12

## 2019-05-10 RX ADMIN — BUSPIRONE HYDROCHLORIDE 10 MG: 10 TABLET ORAL at 08:43

## 2019-05-10 RX ADMIN — ACETAMINOPHEN 650 MG: 325 TABLET ORAL at 12:36

## 2019-05-10 ASSESSMENT — PAIN SCALES - GENERAL
PAINLEVEL_OUTOF10: 5
PAINLEVEL_OUTOF10: 5
PAINLEVEL_OUTOF10: 4
PAINLEVEL_OUTOF10: 0

## 2019-05-10 NOTE — GROUP NOTE
Group Therapy Note    Date: May 10    Group Start Time: 1000  Group End Time: [facility]  Group Topic: Psychoeducation    [address] [place], [place]        Group Therapy Note    Attendees: 9    Patient's Goal: To practice gratitude while completing a mandala identifying things one is grateful for. Notes: Pt engaged in group activity. Pt shared things he is grateful for with the group members. Status After Intervention:  Improved    Participation Level:  Active Listener and Interactive    Participation Quality: Appropriate, Attentive and Sharing      Speech:  normal      Thought Process/Content: Logical      Affective Functioning: Congruent      Mood: anxious      Level of consciousness:  Alert and Oriented x4      Response to Learning: Capable of insight      Endings: None Reported    Modes of Intervention: Education, Support, Socialization and Activity      Discipline Responsible: Psychoeducational Specialist      Signature:  Bess Yanes MA, CTRS

## 2019-05-10 NOTE — BH NOTE
Defined Limits    Patient Monitoring:  Frequency of Checks: 4 times per hour, close    Psychiatric Symptoms:   Depression Symptoms  Depression Symptoms: Isolative  Anxiety Symptoms  Anxiety Symptoms: Generalized  Cami Symptoms  Cami Symptoms: No problems reported or observed. Psychosis Symptoms  Delusion Type: No problems reported or observed. Suicide Risk CSSR-S:  1) Within the past month, have you wished you were dead or wished you could go to sleep and not wake up? : Yes  2) Have you actually had any thoughts of killing yourself? : No  3) Have you been thinking about how you might kill yourself? : No  5) Have you started to work out or worked out the details of how to kill yourself?  Do you intend to carry out this plan? : No  6) Have you ever done anything, started to do anything, or prepared to do anything to end your life?: No  Change in Resultnone Change in Plan of carenone      EDUCATION:   Learner Progress Toward Treatment Goals: Reviewed goals and plan of care    Method: Individual    Outcome: Verbalized understanding    PATIENT GOALS: To plan for discharge    PLAN/TREATMENT RECOMMENDATIONS UPDATE:Plan for discharge    GOALS UPDATE:   Time frame for Short-Term Goals: 2 days      Angela Otoole RN

## 2019-05-10 NOTE — GROUP NOTE
Group Therapy Note    Date: May 10    Group Start Time: 1315  Group End Time: 5310  Group Topic: Cognitive Skills    [address] [facility] [place]        Group Therapy Note    Attendees: 13    Patients learned about healthy vs. unhealthy relationships and group discussed setting personal boundaries in relationships. Notes:  Patient was engaged in group and contributed to group discussion. Status After Intervention:  Improved    Participation Level:  Active Listener and Interactive    Participation Quality: Appropriate and Attentive      Speech:  normal      Thought Process/Content: Logical  Linear      Affective Functioning: Constricted/Restricted      Mood: anxious      Level of consciousness:  Alert and Oriented x4      Response to Learning: Able to verbalize current knowledge/experience, Able to verbalize/acknowledge new learning, Able to retain information and Capable of insight      Endings: None Reported    Modes of Intervention: Education, Support, Socialization, Exploration, Clarifying, Problem-solving and Activity      Discipline Responsible: /Counselor      Signature:  RAYMUNDO Mittal-S, R-ANELT

## 2019-05-10 NOTE — GROUP NOTE
Group Therapy Note    Date: May 10    Group Start Time: 1100  Group End Time: 1150  Group Topic: Psychotherapy    [address]        Group Therapy Note    Attendees: 7    Patient shared and set a goal at the beginning of group to practice a new way of being in group today. Notes:  Pt was engaged and set goal to DeniseThaniaSusan". Pt appeared to meet goal today as she shared struggling with setting boundaries in her romantic relationships. Status After Intervention:  Improved    Participation Level:  Active Listener and Interactive    Participation Quality: Appropriate and Attentive      Speech:  normal      Thought Process/Content: Logical  Linear      Affective Functioning: Constricted/Restricted      Mood: anxious and depressed      Level of consciousness:  Alert and Oriented x4      Response to Learning: Able to verbalize current knowledge/experience, Able to verbalize/acknowledge new learning and Able to retain information      Endings: None Reported    Modes of Intervention: Education, Support, Socialization, Exploration and Clarifying      Discipline Responsible: /Counselor      Signature:  HARIS Cordoba, DAYANA

## 2019-05-10 NOTE — PROGRESS NOTES
Chief Complaint: substance abuse    Patients chart was reviewed and collaborated with staff as well around discharge planning. Reviewed with the patient. Dictated discharge summary. At this time patient is not probateable or holdable and is not suicidal/homicidal. Spent 35 minutes on discharge, and more then 50 % of that time was spent with counseling patient on discharge goals.

## 2019-05-10 NOTE — GROUP NOTE
Group Therapy Note    Date: May 9    Group Start Time: 2025  Group End Time: 2045  Group Topic: Wrap-Up    [address] [facility]        Group Therapy Note    Attendees: goals and the importance of goal setting discussed. Night time milieu activities discussed.          Patient's Goal:  Get some rest    Notes:  Medication is making tired    Status After Intervention:  Improved    Participation Level: Interactive    Participation Quality: Appropriate      Speech:  normal      Thought Process/Content: Logical      Affective Functioning: Congruent      Mood: depressed      Level of consciousness:  Alert and Oriented x4      Response to Learning: Progressing to goal      Endings: None Reported    Modes of Intervention: Support      Discipline Responsible: [place]      Signature:  Dawna Lee

## 2019-05-10 NOTE — BH NOTE
Writer made contact with Cristino Calle from [facility] team regarding consulting with pt to provide support and resources to manage her substance use. Cristino Calle stated that there are not many team members in the office today, but that they will do their best to come over.     Melody Mera MSW, LSW

## 2019-05-10 NOTE — BH NOTE
[address] [facility]  Discharge Note    Pt discharged with followings belongings:   Vision - Corrective Lenses: (sunglasses)  Jewelry: Ring, Bracelet  Clothing: Jacket / coat, Pants, Shirt, Undergarments (Comment), Socks, Footwear  Other Valuables: Purse, Cell phone, Wallet(make up)   Valuables sent home with pt. Valuables retrieved from safe, Security envelope number:  yes and returned to patient. Patient left department with Departure Mode: Family member via Mobility at Departure: Ambulatory, discharged to Discharged to: Private Residence. Patient education on aftercare instructions: yes  Information faxed to NA by NA Patient verbalize understanding of AVS:  yes. Status EXAM upon discharge:  Status and Exam  Normal: No  Facial Expression: Worried  Affect: Blunt  Level of Consciousness: Alert  Mood:Normal: No  Mood: Irritable  Motor Activity:Normal: Yes  Motor Activity: Decreased  Interview Behavior: Cooperative  Preception: [place] to Person, Anitha Sonja to Time, [place] to Place, [place] to Situation  Attention:Normal: Yes  Thought Processes: Circumstantial  Thought Content:Normal: Yes  Hallucinations: None  Delusions: No  Memory:Normal: Yes  Insight and Judgment: No  Insight and Judgment: (IMPROVING)  Present Suicidal Ideation: No  Present Homicidal Ideation: No    Steve Whitmore RN    Bridge Appointment completed: Reviewed Discharge Instructions with patient. Patient verbalizes understanding and agreement with the discharge plan using the teachback method.      Referral for Outpatient Tobacco Cessation Counseling, upon discharge (rashida X if applicable and completed):    ( )  Hospital staff assisted patient to call Quit Line or faxed referral during hospitalization             (X)  Recognizing danger situations (included triggers and roadblocks), if not completed on admission                    (X)  Coping skills (new ways to manage stress, exercise, relaxation techniques, changing routine, distraction), if not completed on admission                                                           (X)  Basic information about quitting (benefits of quitting, techniques in how to quit, available resources, if not completed on admission  ( ) Referral for counseling faxed to [facility]   ( ) Patient refused referral  ( ) Patient refused counseling  ( ) Patient refused smoking cessation medication upon discharge    Vaccinations (rashida X if applicable and completed):  ( ) Patient states already received influenza vaccine elsewhere  ( ) Patient received influenza vaccine during this hospitalization  ( ) Patient refused influenza vaccine at this time    FLU SHOT NOT INDICATED.

## 2019-05-10 NOTE — PROGRESS NOTES
.1:1 Assessment 10 minutes. Patient is alert and oriented x 4. Uses direct eye contact and is dressed appropriately in street clothing. Patient denied SI/HI,A/V hallucinations. The patient stated her mood was:\". Better today\". Patient did attend groups this shift and participated appropriately. The patient does understand why she is here. Patient is medication compliant. Judgement,insight and impulse control are limitedVital signs are stable. Safety checks continue Q 15 minutes throughout the shift. PRNS this shift? YES. Patient obtained 6.5 hours of sleep this shift.

## 2019-06-10 NOTE — DISCHARGE SUMMARY
[address]                 [address] [facility], [place]                               DISCHARGE SUMMARY    PATIENT NAME: Myke Joiner                      :        1990  MED REC NO:   [id number]                          ROOM:       2317  ACCOUNT NO:   [de-identified]                           ADMIT DATE: 2019  PROVIDER:     Shaina Phoenix. Andry Curiel MD                  DISCHARGE DATE:  05/10/2019    DISPOSITION:  The patient is being discharged home. Follow up in  outpatient services through [facility]. DISCHARGE MEDICATIONS:  BuSpar 10 mg twice a day and Flonase 1 spray  daily. CONDITION AT DISCHARGE:  Stable. MENTAL STATUS:  The patient is alert, oriented to person, place, and  time. No threats to harm self or others. No psychotic symptoms. Insight and judgment improved. Mood was good. Affect was relatively  bright. DISCHARGE DIAGNOSES:  AXIS I:  1. Major depressive episode, recurrent, nonpsychotic, severe. 2.  Polysubstance abuse, chronic. AXIS II:  Deferred. AXIS III:  Asthma. AXIS IV:  Moderate. AXIS V:  55. CHIEF COMPLAINT:  Depression and suicidal ideation. HISTORY:  A 46-year-old female presented to [facility]. Had  thoughts of hurting herself and was trying to take pills. She states  she was having mood swings and feels like she is in withdrawal or under  the influence of drugs. She has a long history of polysubstance abuse  as well. Fentanyl is her drug of choice. She snorts it and has in the  past.  Has a history of cocaine and alcohol use as well. HOSPITAL COURSE:  The patient was continued on CIWA and COWS precautions  initially. BuSpar 10 mg t.i.d. for anxiety was added. The patient did  not exhibit any significant withdrawal symptoms during her  hospitalization. She did have mild withdrawal with nausea, body aches,  and fatigue. Denied any SI once hospitalized.   She was not interested  in any type of rehab at discharge as well. We tried to set up services  through [facility] and this information was sent  with her, but it appears she is not quite ready. Irina Waite from [facility]  attended to make contact with the patient regarding outpatient services.         Francisco Landa MD    D: 06/09/2019 21:43:00       T: 06/10/2019 3:00:55     [email or web address]  Job#: [id number]     Doc#: [id number]    CC:

## 2022-09-14 NOTE — PLAN OF CARE
Problem: Depressive Behavior With or Without Suicide Precautions:  Goal: Absence of self-harm  Description  Absence of self-harm  Outcome: Ongoing   S: \"Can you tell me how many days I've been here and when I get to leave? No I don't feel like hurting myself. \"  O: Visible on the unit at intervals and she is med compliant. Affect is blunted. Eye contact direct. Gait is steady. A: No current suicidal ideation noted. Future oriented and hopeful for discharge soon. P: Continue as formulated. 1:1 interactions to administer meds, encourage group attendance and discuss pt progress since admission. no

## 2023-05-13 NOTE — ED NOTES
ED deysi Moses Be remains at bedside as / constant observer, pt in bed, alert, oriented x4, denies needs. No distress noted.       Kimmy Skinner RN  05/07/19 2027 Spontaneous, unlabored and symmetrical